# Patient Record
Sex: FEMALE | Race: WHITE
[De-identification: names, ages, dates, MRNs, and addresses within clinical notes are randomized per-mention and may not be internally consistent; named-entity substitution may affect disease eponyms.]

---

## 2017-12-16 NOTE — EDM.PDOC
ED HPI GENERAL MEDICAL PROBLEM





- General


Chief Complaint: Lower Extremity Injury/Pain


Stated Complaint: KNEE FRACTURE [id number]


Time Seen by Provider: 12/16/17 14:05


Source of Information: Reports: Patient, RN, RN Notes Reviewed


History Limitations: Reports: No Limitations





- History of Present Illness


INITIAL COMMENTS - FREE TEXT/NARRATIVE: 


Pt presents to the ER with c/o right knee/ankle pain. She states she fell 

outside her home last evening. She states the pain has been increasing as she 

has been walking on it today. She states the knee is more painful than the 

ankle. 


Onset: Today, Sudden


Location: Reports: Lower Extremity, Right


Quality: Reports: Sharp, Throbbing


Severity: Moderate


Improves with: Reports: None


Worsens with: Reports: None


Associated Symptoms: Reports: No Other Symptoms


  ** Right Knee


Pain Score (Numeric/FACES): 9





- Related Data


 Allergies











Allergy/AdvReac Type Severity Reaction Status Date / Time


 


amoxicillin Allergy  Rash Verified 12/16/17 13:58











Home Meds: 


 Home Meds





. [No Known Home Meds]  12/30/14 [History]











Past Medical History





- Past Health History


Medical/Surgical History: Denies Medical/Surgical History


HEENT History: Reports: None


Cardiovascular History: Reports: None


Respiratory History: Reports: None


Gastrointestinal History: Reports: None


Genitourinary History: Reports: None


OB/GYN History: Reports: None


Musculoskeletal History: Reports: None


Neurological History: Reports: None


Psychiatric History: Reports: None


Endocrine/Metabolic History: Reports: None


Hematologic History: Reports: None


Immunologic History: Reports: None


Oncologic (Cancer) History: Reports: None


Dermatologic History: Reports: None





- Infectious Disease History


Infectious Disease History: Reports: None





- Past Surgical History


Head Surgeries/Procedures: Reports: None





Social & Family History





- Family History


Family Medical History: Noncontributory





- Tobacco Use


Smoking Status *Q: Current Every Day Smoker


Years of Tobacco use: 16


Packs/Tins Daily: 1


Used Tobacco, but Quit: No


Second Hand Smoke Exposure: Yes





- Caffeine Use


Caffeine Use: Reports: Coffee





- Alcohol Use


Days Per Week of Alcohol Use: 4


Number of Drinks Per Day: 2


Total Drinks Per Week: 8





- Recreational Drug Use


Recreational Drug Use: No





- Living Situation & Occupation


Occupation: Employed





Review of Systems





- Review of Systems


Review Of Systems: ROS reveals no pertinent complaints other than HPI.





ED EXAM, GENERAL





- Physical Exam


Exam: See Below


Exam Limited By: No Limitations


General Appearance: Alert, WD/WN, No Apparent Distress


Eye Exam: Bilateral Eye: EOMI, Normal Inspection


Ears: Normal External Exam, Hearing Grossly Normal


Nose: Normal Inspection


Throat/Mouth: Normal Inspection, Normal Voice, No Airway Compromise


Head: Atraumatic, Normocephalic


Neck: Normal Inspection, Supple, Non-Tender, Full Range of Motion


Respiratory/Chest: No Respiratory Distress, Lungs Clear, Normal Breath Sounds, 

No Accessory Muscle Use, Chest Non-Tender


Cardiovascular: Normal Peripheral Pulses, Regular Rate, Rhythm, No Edema, No 

Gallop, No JVD, No Murmur, No Rub


Peripheral Pulses: 2+: Radial (L), Radial (R), Dorsalis Pedis (L), Dorsalis 

Pedis (R)


GI/Abdominal: Normal Bowel Sounds, Soft, Non-Tender


 (Female) Exam: Deferred


Rectal (Female) Exam: Deferred


Back Exam: Normal Inspection, Full Range of Motion, NT


Extremities: Normal Inspection, Normal Capillary Refill, Leg Pain (right knee), 

Limited Range of Motion (right knee)


Neurological: Alert, Oriented, CN II-XII Intact, Normal Cognition, Normal 

Reflexes, No Motor/Sensory Deficits


Psychiatric: Normal Affect, Normal Mood


Skin Exam: Warm, Dry, Intact, Normal Color, No Rash


Lymphatic: No Adenopathy





ED TRAUMA EXTREMITY PROCEDURES





- Splinting


  ** Right Lower Extremity


Splint Site: Right knee


Pre-Procedure NV Status: Normal


Post-Procedure NV Status: Normal


Splint Material: Velcro


Splint Design: Knee Immobilizer


Applied & Form Fitted By: Provider, Nurse


Provider Post-Splint Application NV Check: NV Status Normal


Complications: No





Course





- Vital Signs


Last Recorded V/S: 


 Last Vital Signs











Temp  97.6 F   12/16/17 13:59


 


Pulse  81   12/16/17 15:41


 


Resp  18   12/16/17 15:41


 


BP  113/74   12/16/17 15:41


 


Pulse Ox  100   12/16/17 15:41














- Radiology Interpretation


Free Text/Narrative:: 


Right ankle xray: No acute findings


Right knee xray: No acute findings. 





Departure





- Departure


Time of Disposition: 15:25


Disposition: Home, Self-Care 01


Clinical Impression: 


Contusion of right knee


Qualifiers:


 Encounter type: initial encounter Qualified Code(s): S80.01XA - Contusion of 

right knee, initial encounter








- Discharge Information


Instructions:  Crutch Use, Easy-to-Read, Knee Sprain, Easy-to-Read, Muscle 

Strain, Easy-to-Read, Knee Immobilizer, Easy-to-Read


Referrals: 


PCP,None [Primary Care Provider] - 


Forms:  ED Department Discharge


Additional Instructions: 


Tylenol or ibuprofen for pain.


Follow up with your primary care facility next week if no improvement.

## 2019-10-06 ENCOUNTER — HOSPITAL ENCOUNTER (EMERGENCY)
Dept: HOSPITAL 43 - DL.ED | Age: 39
Discharge: HOME | End: 2019-10-06
Payer: SELF-PAY

## 2019-10-06 VITALS — DIASTOLIC BLOOD PRESSURE: 87 MMHG | HEART RATE: 98 BPM | SYSTOLIC BLOOD PRESSURE: 152 MMHG

## 2019-10-06 DIAGNOSIS — E03.9: ICD-10-CM

## 2019-10-06 DIAGNOSIS — Z79.899: ICD-10-CM

## 2019-10-06 DIAGNOSIS — Z88.8: ICD-10-CM

## 2019-10-06 DIAGNOSIS — F17.210: ICD-10-CM

## 2019-10-06 DIAGNOSIS — Z88.1: ICD-10-CM

## 2019-10-06 DIAGNOSIS — J06.9: Primary | ICD-10-CM

## 2019-10-06 DIAGNOSIS — K21.9: ICD-10-CM

## 2019-10-06 PROCEDURE — 99282 EMERGENCY DEPT VISIT SF MDM: CPT

## 2019-10-06 NOTE — EDM.PDOC
ED HPI GENERAL MEDICAL PROBLEM





- General


Chief Complaint: General


Stated Complaint: BAD COUGH


Time Seen by Provider: 10/06/19 23:15


Source of Information: Reports: Patient


History Limitations: Reports: No Limitations





- History of Present Illness


INITIAL COMMENTS - FREE TEXT/NARRATIVE: 





This 40 yo female patient reports to the ED with a 6 day history of increasing 

productive cough with chills. The patient reports she has been coughing up 

greenish sputum over the past 3-4 days. The patient reports she has been taking 

over the counter medications, but her symptoms have continued to get worse. 


Duration: Day(s): (6), Constant, Getting Worse


Location: Reports: Chest


Quality: Reports: Other


Severity: Moderate


Improves with: Reports: None


Worsens with: Reports: None


Context: Reports: Other


Associated Symptoms: Reports: cough w sputum, Fever/Chills


Treatments PTA: Reports: Other Medication(s)


  ** Chest


Pain Score (Numeric/FACES): 6





- Related Data


 Allergies











Allergy/AdvReac Type Severity Reaction Status Date / Time


 


amoxapine Allergy  Rash Verified 10/06/19 23:03


 


amoxicillin Allergy  Rash Verified 10/06/19 23:03











Home Meds: 


 Home Meds





Levothyroxine [Synthroid] 50 mcg PO DAILY 18 [History]


Lansoprazole [Prevacid] 30 mg PO DAILY 19 [History]











Past Medical History





- Past Health History


Medical/Surgical History: Denies Medical/Surgical History


HEENT History: Reports: None


Cardiovascular History: Reports: None


Respiratory History: Reports: None


Gastrointestinal History: Reports: GERD


Genitourinary History: Reports: None


OB/GYN History: Reports: Pregnancy, Spontaneous 


Musculoskeletal History: Reports: None


Neurological History: Reports: None, Seizure, Other (See Below)


Other Neuro History: Seizures when she drinks


Psychiatric History: Reports: Addiction, Depression, Suicidal Ideation


Endocrine/Metabolic History: Reports: Hypothyroidism


Hematologic History: Reports: Anemia, Blood Transfusion(s), Other (See Below)


Other Hematologic History: Thalassemia trait


Immunologic History: Reports: None


Oncologic (Cancer) History: Reports: None


Dermatologic History: Reports: None





- Infectious Disease History


Infectious Disease History: Reports: None





- Past Surgical History


Head Surgeries/Procedures: Reports: None


HEENT Surgical History: Reports: Eye Surgery, Other (See Below)


Other HEENT Surgeries/Procedures: Lazy eye


Cardiovascular Surgical History: Reports: None


Respiratory Surgical History: Reports: None


GI Surgical History: Reports: Cholecystectomy


Female  Surgical History: Reports:  Section


Musculoskeletal Surgical History: Reports: None





Social & Family History





- Family History


Family Medical History: Noncontributory





- Tobacco Use


Smoking Status *Q: Current Every Day Smoker


Years of Tobacco use: 30


Packs/Tins Daily: 1





- Caffeine Use


Caffeine Use: Reports: Coffee


Other Caffeine Use: Pills


Caffeine Use Comment: coffee, 8 cups





- Recreational Drug Use


Recreational Drug Use: No





- Living Situation & Occupation


Occupation: Employed





ED ROS GENERAL





- Review of Systems


Review Of Systems: ROS reveals no pertinent complaints other than HPI.





ED EXAM, GENERAL





- Physical Exam


Exam: See Below


Exam Limited By: No Limitations


General Appearance: Alert, WD/WN, Moderate Distress


Eye Exam: Bilateral Eye: EOMI, Normal Inspection, PERRL


Ears: Normal External Exam, Normal Canal, Hearing Grossly Normal, Normal TMs


Nose: Normal Inspection, Normal Mucosa, No Blood


Throat/Mouth: Normal Inspection, Normal Lips, Normal Teeth, Normal Gums, Normal 

Oropharynx, Normal Voice, No Airway Compromise


Head: Atraumatic, Normocephalic


Neck: Normal Inspection, Supple, Non-Tender, Full Range of Motion


Respiratory/Chest: No Respiratory Distress, No Accessory Muscle Use, Chest Non-

Tender, Rhonchi (diffuse throughout lower lobes)


Cardiovascular: Normal Peripheral Pulses, Regular Rate, Rhythm, No Edema, No 

Gallop, No JVD, No Murmur, No Rub


GI/Abdominal: Normal Bowel Sounds, Soft, Non-Tender, No Organomegaly, No 

Distention, No Abnormal Bruit, No Mass


 (Female) Exam: Deferred


Rectal (Female) Exam: Deferred


Back Exam: Normal Inspection, Full Range of Motion, NT


Extremities: Normal Inspection, Normal Range of Motion, Non-Tender, Normal 

Capillary Refill, No Pedal Edema


Neurological: Alert, Oriented, CN II-XII Intact, Normal Cognition, Normal Gait, 

Normal Reflexes, No Motor/Sensory Deficits


Psychiatric: Normal Affect, Normal Mood


Skin Exam: Warm, Dry, Intact, Normal Color, No Rash


Lymphatic: No Adenopathy





Course





- Vital Signs


Last Recorded V/S: 


 Last Vital Signs











Temp  36.3 C   10/06/19 23:04


 


Pulse  98   10/06/19 23:04


 


Resp  16   10/06/19 23:04


 


BP  152/87 H  10/06/19 23:04


 


Pulse Ox  98   10/06/19 23:04














- Orders/Labs/Meds


Meds: 


Medications














Discontinued Medications














Generic Name Dose Route Start Last Admin





  Trade Name Noam  PRN Reason Stop Dose Admin


 


Azithromycin  500 mg  10/06/19 23:23  





  Zithromax  PO  10/06/19 23:24  





  ONETIME ONE   





     





     





     





     














Departure





- Departure


Time of Disposition: 23:26


Disposition: Home, Self-Care 01


Condition: Fair


Clinical Impression: 


URI (upper respiratory infection)


Qualifiers:


 URI type: unspecified URI Qualified Code(s): J06.9 - Acute upper respiratory 

infection, unspecified








- Discharge Information


*PRESCRIPTION DRUG MONITORING PROGRAM REVIEWED*: Not Applicable


*COPY OF PRESCRIPTION DRUG MONITORING REPORT IN PATIENT CARMEN: Not Applicable


Instructions:  Upper Respiratory Infection, Adult, Easy-to-Read


Forms:  ED Department Discharge


Care Plan Goals: 


The patient was advised of the examination and lab results during the visit. 

The patient was given an oral dose of Azithromycin while in the ED. The patient 

was given a dose of Robitussin AC #5mL to take at bedtime tonight. The patient 

was discharged with a script for Azithromycin (250 mg) #4 to take 1 by mouth on 

for 4 days and Robitussin AC #100 mL to take 5 mL by mouth at bedtime as needed 

for cough. If the patient has any additional symptoms or concerns, the patient 

should visit her primary care facility or return to the emergency department.

## 2021-08-16 NOTE — EDM.PDOC
ED HPI GENERAL MEDICAL PROBLEM





- General


Chief Complaint: Cardiovascular Problem


Stated Complaint: AMBULANCE


Time Seen by Provider: 21 14:58


Source of Information: Reports: Patient, EMS, RN, RN Notes Reviewed


History Limitations: Reports: No Limitations





- History of Present Illness


INITIAL COMMENTS - FREE TEXT/NARRATIVE: 


Sandra is a 41 y/o female with a history if iron deficiency anemia and recent 

diagnosis of pneumonia who presents to the ED via [facility] with 

complaints of presyncope.  The patient states she has continued on her 

doxycycline, as prescribed by her PCP five days ago, however she is not taking 

her ferrous sulfate.  The patient states she was bending over at work when she 

became dizzy; she denies loss of consciousness but notes she did strike her head

on the ground due to tipping forward.  She denies headache, seizure-like 

activity, vision changes, pupillary changes, or projectile vomiting.  She does 

not take daily blood thinners and denies blood dyscrasias.  Additionally, she 

notes chest tightness with transient shortness of breath.  She denies persistent

dizziness, fever, shaking chills, chest pain, palpitations, cough, sore throat, 

dyspepsia, nausea, vomiting, abdominal pain, or diarrhea.  She notes her last 

meal was yesterday night; her only food was one cup of coffee today.  She attest

to smoking one pack of cigarettes per day as well as occasionally alcohol use; 

she denies recreational drug use. 








  ** Hand


Pain Score (Numeric/FACES): 4





- Related Data


                                    Allergies











Allergy/AdvReac Type Severity Reaction Status Date / Time


 


amoxapine Allergy  Rash Verified 21 14:50


 


amoxicillin Allergy  Rash Verified 21 14:50











Home Meds: 


                                    Home Meds





Ibuprofen [Ibu] 600 mg PO DAILY 21 [History]


levonorgestreL [Mirena] 1 device VAG ASDIRECTED 21 [History]


Doxycycline Hyclate 100 mg PO BID 21 [History]


Levothyroxine Sodium [Levothyroxine] 75 mcg PO ASDIRECTED 21 [History]











Past Medical History





- Past Health History


Medical/Surgical History: Denies Medical/Surgical History


HEENT History: Reports: None


Cardiovascular History: Reports: None


Respiratory History: Reports: None


Gastrointestinal History: Reports: GERD


Genitourinary History: Reports: Other (See Below)


Other Genitourinary History: HX OF ACUTE CYSTITIS WITH HEMATURIA.  ASCUS WITH 

POSITIVE HIGH RISK HPV CERVICAL


OB/GYN History: Reports: Pregnancy, Spontaneous 


Musculoskeletal History: Reports: None


Neurological History: Reports: Migraines, Seizure, Other (See Below)


Other Neuro History: Seizures when she drinks


Psychiatric History: Reports: Addiction, Depression, Suicidal Ideation


Endocrine/Metabolic History: Reports: Hypothyroidism, Obesity/BMI 30+


Hematologic History: Reports: Anemia, Blood Transfusion(s), Other (See Below)


Other Hematologic History: Thalassemia trait


Immunologic History: Reports: None


Oncologic (Cancer) History: Reports: None


Dermatologic History: Reports: None





- Infectious Disease History


Infectious Disease History: Reports: None





- Past Surgical History


Head Surgeries/Procedures: Reports: None


HEENT Surgical History: Reports: Eye Surgery, Other (See Below)


Other HEENT Surgeries/Procedures: Lazy eye


Cardiovascular Surgical History: Reports: None


Respiratory Surgical History: Reports: None


GI Surgical History: Reports: Cholecystectomy


Female  Surgical History: Reports:  Section


Musculoskeletal Surgical History: Reports: None





Social & Family History





- Family History


Family Medical History: No Pertinent Family History





- Tobacco Use


Tobacco Use Status *Q: Current Every Day Tobacco User


Years of Tobacco use: 25


Packs/Tins Daily: 1





- Caffeine Use


Caffeine Use: Reports: Coffee


Other Caffeine Use: Pills


Caffeine Use Comment: coffee, 8 cups





- Recreational Drug Use


Recreational Drug Use: No





- Living Situation & Occupation


Occupation: Employed





ED ROS GENERAL





- Review of Systems


Review Of Systems: Comprehensive ROS is negative, except as noted in HPI.





ED EXAM, GENERAL





- Physical Exam


Exam: See Below


Exam Limited By: No Limitations


General Appearance: Alert, No Apparent Distress, Thin, Other (Ill-appearing)


Eye Exam: Bilateral Eye: EOMI, Normal Inspection, PERRL (3mm)


Ears: Normal External Exam, Normal Canal, Hearing Grossly Normal, Normal TMs


Ear Exam: Bilateral Ear: Auricle Normal, Canal Normal, TM normal


Nose: Normal Inspection, Normal Mucosa, No Blood


Throat/Mouth: Normal Inspection, Normal Oropharynx, Normal Voice, No Airway 

Compromise


Head: Atraumatic, Normocephalic.  No: Facial Swelling, Facial Tenderness, Sinus 

Tenderness


Neck: Other (No cervical point tenderness or pain with 

flexion/extension/rotation of neck.  NEXUS criteria negative.)


Respiratory/Chest: No Respiratory Distress, Lungs Clear, Normal Breath Sounds, 

No Accessory Muscle Use, Chest Non-Tender, Rhonchi (To bilateral lobes), Other 

(Productive cough).  No: Crackles, Rales, Wheezing, Stridor, Retractions


Cardiovascular: Normal Peripheral Pulses, Regular Rate, Rhythm, No Edema, No 

Gallop, No JVD, No Murmur, No Rub


Peripheral Pulses: 2+: Radial (L), Radial (R)


GI/Abdominal: Normal Bowel Sounds, Soft, Non-Tender.  No: Guarding, Rigid, 

Rebound


 (Female) Exam: Deferred


Rectal (Female) Exam: Deferred


Back Exam: Normal Inspection, Full Range of Motion


Extremities: Normal Inspection, Normal Range of Motion, Normal Capillary Refill


Neurological: Alert, Oriented, CN II-XII Intact, Normal Cognition, Normal 

Reflexes, No Motor/Sensory Deficits.  No: Slow to Respond, Memory Loss Remote 

Events, Memory Loss Recent Events


Psychiatric: Normal Affect, Normal Mood


Skin Exam: Warm, Dry, Intact, No Rash, Pallor.  No: Cyanosis, Jaundice, Mottled


  ** #1 Interpretation


EKG Date: 21


Time: 15:12


Rhythm: NSR


Rate (Beats/Min): 76


Axis: Normal


P-Wave: Present


QRS: Normal


ST-T: Normal


QT: Normal


WA/PQ Interval: 0.167


Comparison: No Change


EKG Interpretation Comments: 


NSR; No evidence of acute myocardial ischemia








Course





- Vital Signs


Last Recorded V/S: 


                                Last Vital Signs











Temp  98.3 F   21 14:45


 


Pulse  81   21 14:45


 


Resp  21 H  21 14:45


 


BP  130/81   21 14:45


 


Pulse Ox  100   21 14:45














- Orders/Labs/Meds


Labs: 


                                Laboratory Tests











  21 Range/Units





  15:10 15:10 15:10 


 


WBC  8.9    (5.0-10.0)  10^3/uL


 


RBC  5.04    (4.2-5.4)  10^6/uL


 


Hgb  7.7 L    (12.0-16.0)  g/dL


 


Hct  25.9 L    (37.0-47.0)  %


 


MCV  51.4 L D    ()  fL


 


MCH  15.3 L    (27.0-34.0)  pg


 


MCHC  29.7 L    (33.0-35.0)  g/dL


 


Plt Count  567 H    (150-450)  10^3/uL


 


Neut % (Auto)  80.2 H    (42.2-75.2)  %


 


Lymph % (Auto)  15.7 L    (20.5-50.1)  %


 


Mono % (Auto)  3.5    (2-8)  %


 


Eos % (Auto)  0.3 L    (1.0-3.0)  %


 


Baso % (Auto)  0.3    (0.0-1.0)  %


 


Sodium   140   (136-145)  mmol/L


 


Potassium   4.0   (3.5-5.1)  mmol/L


 


Chloride   103   ()  mmol/L


 


Carbon Dioxide   25   (21-32)  mmol/L


 


Anion Gap   16.0 H   (7-13)  mEq/L


 


BUN   17   (7-18)  mg/dL


 


Creatinine   0.75   (0.55-1.02)  mg/dL


 


Est Cr Clr Drug Dosing   86.96   mL/min


 


Estimated GFR (MDRD)   > 60   


 


BUN/Creatinine Ratio   22.7   (No establ ref range)  


 


Glucose   121 H   (70-99)  mg/dL


 


Lactic Acid    1.8  (0.4-2.0)  mmol/L


 


Calcium   8.7   (8.5-10.1)  mg/dL


 


Magnesium   2.1   (1.8-2.4)  mg/dL


 


Total Bilirubin   0.2   (0.2-1.0)  mg/dL


 


AST   8 L   (15-37)  U/L


 


ALT   22   (14-59)  U/L


 


Alkaline Phosphatase   53   ()  U/L


 


Troponin I High Sens   < 4   (<=51)  pg/mL


 


C-Reactive Protein   < 0.2   (0.0-0.9)  mg/dL


 


Total Protein   7.3   (6.4-8.2)  g/dL


 


Albumin   3.5   (3.4-5.0)  g/dL


 


Globulin   3.8   


 


Albumin/Globulin Ratio   0.9   


 


Urine Color     (YELLOW)  


 


Urine Appearance     (CLEAR)  


 


Urine pH     (5.0-9.0)  


 


Ur Specific Gravity     (1.005-1.030)  


 


Urine Protein     (NEGATIVE)  


 


Urine Glucose (UA)     (NEGATIVE)  


 


Urine Ketones     (NEGATIVE)  


 


Urine Occult Blood     (NEGATIVE)  


 


Urine Nitrite     (NEGATIVE)  


 


Urine Bilirubin     (NEGATIVE)  


 


Urine Urobilinogen     (0.2-1.0)  mg/dL


 


Ur Leukocyte Esterase     (NEGATIVE)  


 


Urine RBC     (0-5)  /HPF


 


Urine WBC     (0-5/HPF)  /HPF


 


Ur Epithelial Cells     (NOT SEEN)  /HPF


 


Urine Bacteria     (0-FEW/HPF)  /HPF


 


Urine Opiates Screen     (NEGATIVE)  


 


Ur Oxycodone Screen     (NEGATIVE)  


 


Urine Methadone Screen     (NEGATIVE)  


 


Ur Barbiturates Screen     (NEGATIVE)  


 


U Tricyclic Antidepress     (NEGATIVE)  


 


Ur Phencyclidine Scrn     (NEGATIVE)  


 


Ur Amphetamine Screen     (NEGATIVE)  


 


U Methamphetamines Scrn     (NEGATIVE)  


 


Urine MDMA Screen     (NEGATIVE)  


 


U Benzodiazepines Scrn     (NEGATIVE)  


 


Urine Cocaine Screen     (NEGATIVE)  


 


U Marijuana (THC) Screen     (NEGATIVE)  














  21 Range/Units





  16:00 16:00 


 


WBC    (5.0-10.0)  10^3/uL


 


RBC    (4.2-5.4)  10^6/uL


 


Hgb    (12.0-16.0)  g/dL


 


Hct    (37.0-47.0)  %


 


MCV    ()  fL


 


MCH    (27.0-34.0)  pg


 


MCHC    (33.0-35.0)  g/dL


 


Plt Count    (150-450)  10^3/uL


 


Neut % (Auto)    (42.2-75.2)  %


 


Lymph % (Auto)    (20.5-50.1)  %


 


Mono % (Auto)    (2-8)  %


 


Eos % (Auto)    (1.0-3.0)  %


 


Baso % (Auto)    (0.0-1.0)  %


 


Sodium    (136-145)  mmol/L


 


Potassium    (3.5-5.1)  mmol/L


 


Chloride    ()  mmol/L


 


Carbon Dioxide    (21-32)  mmol/L


 


Anion Gap    (7-13)  mEq/L


 


BUN    (7-18)  mg/dL


 


Creatinine    (0.55-1.02)  mg/dL


 


Est Cr Clr Drug Dosing    mL/min


 


Estimated GFR (MDRD)    


 


BUN/Creatinine Ratio    (No establ ref range)  


 


Glucose    (70-99)  mg/dL


 


Lactic Acid    (0.4-2.0)  mmol/L


 


Calcium    (8.5-10.1)  mg/dL


 


Magnesium    (1.8-2.4)  mg/dL


 


Total Bilirubin    (0.2-1.0)  mg/dL


 


AST    (15-37)  U/L


 


ALT    (14-59)  U/L


 


Alkaline Phosphatase    ()  U/L


 


Troponin I High Sens    (<=51)  pg/mL


 


C-Reactive Protein    (0.0-0.9)  mg/dL


 


Total Protein    (6.4-8.2)  g/dL


 


Albumin    (3.4-5.0)  g/dL


 


Globulin    


 


Albumin/Globulin Ratio    


 


Urine Color  Yellow   (YELLOW)  


 


Urine Appearance  Clear   (CLEAR)  


 


Urine pH  6.5   (5.0-9.0)  


 


Ur Specific Gravity  1.015   (1.005-1.030)  


 


Urine Protein  Negative   (NEGATIVE)  


 


Urine Glucose (UA)  Negative   (NEGATIVE)  


 


Urine Ketones  Negative   (NEGATIVE)  


 


Urine Occult Blood  Trace-intact H   (NEGATIVE)  


 


Urine Nitrite  Negative   (NEGATIVE)  


 


Urine Bilirubin  Negative   (NEGATIVE)  


 


Urine Urobilinogen  0.2   (0.2-1.0)  mg/dL


 


Ur Leukocyte Esterase  Negative   (NEGATIVE)  


 


Urine RBC  0-5   (0-5)  /HPF


 


Urine WBC  0-5   (0-5/HPF)  /HPF


 


Ur Epithelial Cells  Moderate H   (NOT SEEN)  /HPF


 


Urine Bacteria  Few   (0-FEW/HPF)  /HPF


 


Urine Opiates Screen   Negative  (NEGATIVE)  


 


Ur Oxycodone Screen   Negative  (NEGATIVE)  


 


Urine Methadone Screen   Negative  (NEGATIVE)  


 


Ur Barbiturates Screen   Negative  (NEGATIVE)  


 


U Tricyclic Antidepress   Negative  (NEGATIVE)  


 


Ur Phencyclidine Scrn   Negative  (NEGATIVE)  


 


Ur Amphetamine Screen   Negative  (NEGATIVE)  


 


U Methamphetamines Scrn   Negative  (NEGATIVE)  


 


Urine MDMA Screen   Negative  (NEGATIVE)  


 


U Benzodiazepines Scrn   Negative  (NEGATIVE)  


 


Urine Cocaine Screen   Negative  (NEGATIVE)  


 


U Marijuana (THC) Screen   Negative  (NEGATIVE)  











Meds: 


Medications














Discontinued Medications














Generic Name Dose Route Start Last Admin





  Trade Name Freq  PRN Reason Stop Dose Admin


 


Lactated Ringer's  1,000 mls @ 999 mls/hr  21 15:01  21 15:14





  Ringers, Lactated  IV  21 16:01  999 mls/hr





  .BOLUS ONE   Administration


 


Metronidazole  500 mg  21 16:35  21 17:04





  Metronidazole 250 Mg Tab  PO  21 16:36  500 mg





  ONETIME ONE   Administration














- Re-Assessments/Exams


Free Text/Narrative Re-Assessment/Exam: 





21


LR 1L bolus initiated while labs pending. 





Findings of examination and lab work reviewed with patient.  Will treat anemia 

with ferrous sulfate and bacterial vaginosis with metronidazole.  Patient 

instructed to follow up with primary care in 5-7 days regarding today's visit.  

Discussed supportive cares as well as red flag signs and symptoms which would 

warrant reevaluation reviewed.  Patient verbalized understanding and agreement 

with the plan of care.











Departure





- Departure


Time of Disposition: 16:48


Disposition: Home, Self-Care 01


Condition: Fair


Clinical Impression: 


 Microcytic hypochromic anemia, Pre-syncope, Bacterial vaginosis





Iron deficiency anemia


Qualifiers:


 Iron deficiency anemia type: other iron deficiency Qualified Code(s): D50.8 - 

Other iron deficiency anemias





Instructions:  Near-Syncope, Bacterial Vaginosis


Forms:  ED Department Discharge


Additional Instructions: 


Rx: ferrous sulfate


Rx: metronidazole





1.) Follow up with your primary care provider by the end of the week for repeat 

lab studies.


2.) Take all of your antibiotic, even as symptoms improve. 


3.) Continue with your doxycycline. 


4.) Eat iron-rich foods often. 


5.) Rest.


6.) Follow up with your primary care provider, or return to the emergency 

department with any persistent or worsening symptoms.

## 2021-12-22 NOTE — CT
PROCEDURE INFORMATION: 

Exam: CT Abdomen And Pelvis Without Contrast 

Exam date and time: 12/22/2021 5:20 PM 

Age: 41 years old 

Clinical indication: Other: Left sided abdominal pain with hematuria 



TECHNIQUE: 

Imaging protocol: Computed tomography of the abdomen and pelvis without 

contrast. 

Radiation optimization: All CT scans at this facility use at least one of these 

dose optimization techniques: automated exposure control; mA and/or kV 

adjustment per patient size (includes targeted exams where dose is matched to 

clinical indication); or iterative reconstruction. 



COMPARISON: 

No relevant prior studies available. 



FINDINGS: 

Lungs: The visualized portions of the lung bases are normal. 

Heart: The heart is not enlarged. 



Liver: The liver is normal. 

Gallbladder and bile ducts: There has been a cholecystectomy. 

Pancreas: The pancreas is normal. 

Spleen: The spleen is normal. 

Adrenal glands: The adrenal glands are normal. 

Kidneys and ureters: The kidneys are normal. The ureters are normal. 

Stomach and bowel: The stomach is normal. No over distention of bowel loops is 

seen. 

Appendix: A normal appendix is identified. 



Intraperitoneal space: No evidence of intraperitoneal free air. 

Vasculature: The aorta is normal. 

Lymph nodes: No pathologic lymph node enlargement is demonstrated. 

Urinary bladder: The bladder is distended. 

Reproductive: The uterus is moderately enlarged measuring about 8.4 cm in 

transverse by 9.6 cm in AP by 8.1 cm in craniocaudal dimension. 

Bones/joints: Unremarkable 

Soft tissues: There is a fat-containing umbilical hernia. 



IMPRESSION: 

No acute abnormality.

## 2021-12-22 NOTE — EDM.PDOC
ED HPI GENERAL MEDICAL PROBLEM





- General


Stated Complaint: PAIN IN STOMACH,HIGH BLOOD PRESSURE


Time Seen by Provider: 21 16:15


Source of Information: Reports: Patient


History Limitations: Reports: No Limitations





- History of Present Illness


INITIAL COMMENTS - FREE TEXT/NARRATIVE: 





This 42 yo female patient reports to the ED with a 2 day history of left lower 

quadrant abdominal pain. The patient reports her pain has been intermittent over

the past 2 days. The patient took ibuprofen once this morning with no symptom 

relief. The patient reports she has had soft stools, but has noticed her abdomen

is "bloated". The patient states she has not ever had similar symptoms in the 

past. The patient has not attempted to get into the clinic for her current 

symptoms. The patient reports she had an appointment with Dr. Johnson yesterday 

for an upper endoscopy, but she did not get up in time and missed her scheduled 

appointment. 


Onset Date: 21


Duration: Intermittent


Location: Reports: Abdomen (LLQ)


Quality: Reports: Ache, Sharp


Severity: Moderate


Improves with: Reports: None


Worsens with: Reports: None


Context: Reports: Other


Associated Symptoms: Reports: No Other Symptoms


Treatments PTA: Reports: NSAIDS


  ** Left Middle Abdominal


Pain Score (Numeric/FACES): 5





- Related Data


                                    Allergies











Allergy/AdvReac Type Severity Reaction Status Date / Time


 


amoxapine Allergy  Rash Verified 21 16:32


 


amoxicillin Allergy  Rash Verified 21 16:32











Home Meds: 


                                    Home Meds





Ibuprofen [Ibu] 600 mg PO DAILY 21 [History]


levonorgestreL [Mirena] 1 device VAG ASDIRECTED 21 [History]


Doxycycline Hyclate 100 mg PO BID 21 [History]


Levothyroxine Sodium [Levothyroxine] 75 mcg PO DAILY 21 [History]


Ferrous Sulfate 325 mg PO BID 21 [History]


Lansoprazole [Prevacid] 30 mg PO DAILY 21 [History]


Multivitamin [Multivitamins] 1 tab PO DAILY 21 [History]











Past Medical History





- Past Health History


Medical/Surgical History: Denies Medical/Surgical History


HEENT History: Reports: None


Cardiovascular History: Reports: None


Respiratory History: Reports: None


Gastrointestinal History: Reports: GERD


Genitourinary History: Reports: Other (See Below)


Other Genitourinary History: HX OF ACUTE CYSTITIS WITH HEMATURIA.  ASCUS WITH 

POSITIVE HIGH RISK HPV CERVICAL


OB/GYN History: Reports: Pregnancy, Spontaneous 


Musculoskeletal History: Reports: None


Neurological History: Reports: Migraines, Seizure, Other (See Below)


Other Neuro History: Seizures when she drinks


Psychiatric History: Reports: Addiction, Depression, Suicidal Ideation


Endocrine/Metabolic History: Reports: Hypothyroidism, Obesity/BMI 30+


Hematologic History: Reports: Anemia, Blood Transfusion(s), Other (See Below)


Other Hematologic History: Thalassemia trait


Immunologic History: Reports: None


Oncologic (Cancer) History: Reports: None


Dermatologic History: Reports: None





- Infectious Disease History


Infectious Disease History: Reports: None





- Past Surgical History


Head Surgeries/Procedures: Reports: None


HEENT Surgical History: Reports: Eye Surgery, Other (See Below)


Other HEENT Surgeries/Procedures: Lazy eye


Cardiovascular Surgical History: Reports: None


Respiratory Surgical History: Reports: None


GI Surgical History: Reports: Cholecystectomy


Female  Surgical History: Reports:  Section


Musculoskeletal Surgical History: Reports: None





Social & Family History





- Family History


Family Medical History: No Pertinent Family History





- Caffeine Use


Caffeine Use: Reports: Coffee


Other Caffeine Use: Pills


Caffeine Use Comment: coffee, 8 cups





- Living Situation & Occupation


Occupation: Employed





ED ROS GENERAL





- Review of Systems


Review Of Systems: Comprehensive ROS is negative, except as noted in HPI.





ED EXAM, GI/ABD





- Physical Exam


Exam: See Below


Exam Limited By: No Limitations


General Appearance: Alert, WD/WN, Mild Distress


Eyes: Bilateral: Normal Appearance, EOMI


Ears: Normal External Exam, Normal Canal, Hearing Grossly Normal, Normal TMs


Nose: Normal Inspection, Normal Mucosa, No Blood


Throat/Mouth: Normal Inspection, Normal Lips, Normal Teeth, Normal Gums, Normal 

Oropharynx, Normal Voice, No Airway Compromise


Head: Atraumatic, Normocephalic


Neck: Normal Inspection, Supple, Non-Tender, Full Range of Motion


Respiratory/Chest: No Respiratory Distress, Lungs Clear, Normal Breath Sounds, 

No Accessory Muscle Use, Chest Non-Tender


Cardiovascular: Normal Peripheral Pulses


GI/Abdominal Exam: Normal Bowel Sounds, No Organomegaly, No Distention, No 

Abnormal Bruit, No Mass, Pelvis Stable, Tender (LLQ)


 (Female) Exam: Deferred


Rectal (Female) Exam: Deferred


Back Exam: Normal Inspection, Full Range of Motion, NT


Extremities: Normal Inspection, Normal Range of Motion, Non-Tender, Normal 

Capillary Refill, No Pedal Edema


Neurological: Alert, Oriented, CN II-XII Intact, Normal Cognition, Normal Gait, 

Normal Reflexes, No Motor/Sensory Deficits


Psychiatric: Normal Affect, Normal Mood


Skin Exam: Warm, Dry, Intact, Normal Color, No Rash


Lymphatic: No Adenopathy





Course





- Vital Signs


Last Recorded V/S: 


                                Last Vital Signs











Temp  98.3 F   21 16:26


 


Pulse  82   21 16:26


 


Resp  18   21 16:26


 


BP  159/96 H  21 16:39


 


Pulse Ox  100   21 16:26














- Orders/Labs/Meds


Labs: 


                                Laboratory Tests











  21 Range/Units





  16:13 16:13 16:13 


 


WBC     (5.0-10.0)  10^3/uL


 


RBC     (4.2-5.4)  10^6/uL


 


Hgb     (12.0-16.0)  g/dL


 


Hct     (37.0-47.0)  %


 


MCV     ()  fL


 


MCH     (27.0-34.0)  pg


 


MCHC     (33.0-35.0)  g/dL


 


Plt Count     (150-450)  10^3/uL


 


Neut % (Auto)     (42.2-75.2)  %


 


Lymph % (Auto)     (20.5-50.1)  %


 


Mono % (Auto)     (2-8)  %


 


Eos % (Auto)     (1.0-3.0)  %


 


Baso % (Auto)     (0.0-1.0)  %


 


Sodium     (136-145)  mmol/L


 


Potassium     (3.5-5.1)  mmol/L


 


Chloride     ()  mmol/L


 


Carbon Dioxide     (21-32)  mmol/L


 


Anion Gap     (7-13)  mEq/L


 


BUN     (7-18)  mg/dL


 


Creatinine     (0.55-1.02)  mg/dL


 


Est Cr Clr Drug Dosing     mL/min


 


Estimated GFR (MDRD)     


 


BUN/Creatinine Ratio     (No establ ref range)  


 


Glucose     (70-99)  mg/dL


 


Calcium     (8.5-10.1)  mg/dL


 


Total Bilirubin     (0.2-1.0)  mg/dL


 


AST     (15-37)  U/L


 


ALT     (14-59)  U/L


 


Alkaline Phosphatase     ()  U/L


 


Total Protein     (6.4-8.2)  g/dL


 


Albumin     (3.4-5.0)  g/dL


 


Globulin     


 


Albumin/Globulin Ratio     


 


Amylase     ()  U/L


 


Lipase     ()  U/L


 


Urine Color  Red    (YELLOW)  


 


Urine Appearance  Cloudy    (CLEAR)  


 


Urine pH  7.0    (5.0-9.0)  


 


Ur Specific Gravity  1.025    (1.005-1.030)  


 


Urine Protein  100 H    (NEGATIVE)  


 


Urine Glucose (UA)  Negative    (NEGATIVE)  


 


Urine Ketones  Negative    (NEGATIVE)  


 


Urine Occult Blood  Large H    (NEGATIVE)  


 


Urine Nitrite  Negative    (NEGATIVE)  


 


Urine Bilirubin  Negative    (NEGATIVE)  


 


Urine Urobilinogen  0.2    (0.2-1.0)  mg/dL


 


Ur Leukocyte Esterase  Negative    (NEGATIVE)  


 


Urine RBC  Packed H    (0-5)  /HPF


 


Urine WBC  0-5    (0-5/HPF)  /HPF


 


Ur Epithelial Cells  Rare    (NOT SEEN)  /HPF


 


Amorphous Sediment  Rare    (NOT SEEN)  /HPF


 


Urine Bacteria  Few    (0-FEW/HPF)  /HPF


 


Urine HCG, Qual   Negative   


 


Urine Opiates Screen    Negative  (NEGATIVE)  


 


Ur Oxycodone Screen    Negative  (NEGATIVE)  


 


Urine Methadone Screen    Negative  (NEGATIVE)  


 


Ur Barbiturates Screen    Negative  (NEGATIVE)  


 


U Tricyclic Antidepress    Negative  (NEGATIVE)  


 


Ur Phencyclidine Scrn    Negative  (NEGATIVE)  


 


Ur Amphetamine Screen    Negative  (NEGATIVE)  


 


U Methamphetamines Scrn    Negative  (NEGATIVE)  


 


Urine MDMA Screen    Negative  (NEGATIVE)  


 


U Benzodiazepines Scrn    Negative  (NEGATIVE)  


 


Urine Cocaine Screen    Negative  (NEGATIVE)  


 


U Marijuana (THC) Screen    Negative  (NEGATIVE)  














  21 Range/Units





  16:29 16:29 


 


WBC  8.5   (5.0-10.0)  10^3/uL


 


RBC  5.22   (4.2-5.4)  10^6/uL


 


Hgb  8.4 L   (12.0-16.0)  g/dL


 


Hct  27.6 L   (37.0-47.0)  %


 


MCV  52.9 L   ()  fL


 


MCH  16.1 L   (27.0-34.0)  pg


 


MCHC  30.4 L   (33.0-35.0)  g/dL


 


Plt Count  548 H   (150-450)  10^3/uL


 


Neut % (Auto)  69.8   (42.2-75.2)  %


 


Lymph % (Auto)  23.7   (20.5-50.1)  %


 


Mono % (Auto)  5.1   (2-8)  %


 


Eos % (Auto)  0.8 L   (1.0-3.0)  %


 


Baso % (Auto)  0.6   (0.0-1.0)  %


 


Sodium   140  (136-145)  mmol/L


 


Potassium   4.1  (3.5-5.1)  mmol/L


 


Chloride   104  ()  mmol/L


 


Carbon Dioxide   27  (21-32)  mmol/L


 


Anion Gap   13.1 H  (7-13)  mEq/L


 


BUN   10  (7-18)  mg/dL


 


Creatinine   0.74  (0.55-1.02)  mg/dL


 


Est Cr Clr Drug Dosing   101.94  mL/min


 


Estimated GFR (MDRD)   > 60  


 


BUN/Creatinine Ratio   13.5  (No establ ref range)  


 


Glucose   93  (70-99)  mg/dL


 


Calcium   8.8  (8.5-10.1)  mg/dL


 


Total Bilirubin   0.3  (0.2-1.0)  mg/dL


 


AST   8 L  (15-37)  U/L


 


ALT   18  (14-59)  U/L


 


Alkaline Phosphatase   53  ()  U/L


 


Total Protein   7.6  (6.4-8.2)  g/dL


 


Albumin   3.8  (3.4-5.0)  g/dL


 


Globulin   3.8  


 


Albumin/Globulin Ratio   1.0  


 


Amylase   51  ()  U/L


 


Lipase   82  ()  U/L


 


Urine Color    (YELLOW)  


 


Urine Appearance    (CLEAR)  


 


Urine pH    (5.0-9.0)  


 


Ur Specific Gravity    (1.005-1.030)  


 


Urine Protein    (NEGATIVE)  


 


Urine Glucose (UA)    (NEGATIVE)  


 


Urine Ketones    (NEGATIVE)  


 


Urine Occult Blood    (NEGATIVE)  


 


Urine Nitrite    (NEGATIVE)  


 


Urine Bilirubin    (NEGATIVE)  


 


Urine Urobilinogen    (0.2-1.0)  mg/dL


 


Ur Leukocyte Esterase    (NEGATIVE)  


 


Urine RBC    (0-5)  /HPF


 


Urine WBC    (0-5/HPF)  /HPF


 


Ur Epithelial Cells    (NOT SEEN)  /HPF


 


Amorphous Sediment    (NOT SEEN)  /HPF


 


Urine Bacteria    (0-FEW/HPF)  /HPF


 


Urine HCG, Qual    


 


Urine Opiates Screen    (NEGATIVE)  


 


Ur Oxycodone Screen    (NEGATIVE)  


 


Urine Methadone Screen    (NEGATIVE)  


 


Ur Barbiturates Screen    (NEGATIVE)  


 


U Tricyclic Antidepress    (NEGATIVE)  


 


Ur Phencyclidine Scrn    (NEGATIVE)  


 


Ur Amphetamine Screen    (NEGATIVE)  


 


U Methamphetamines Scrn    (NEGATIVE)  


 


Urine MDMA Screen    (NEGATIVE)  


 


U Benzodiazepines Scrn    (NEGATIVE)  


 


Urine Cocaine Screen    (NEGATIVE)  


 


U Marijuana (THC) Screen    (NEGATIVE)  














- Radiology Interpretation


Free Text/Narrative:: 





[facility] 


Final Radiology Report  Call: [phone]


assistance Online chat: [email or web address]


Name: RASHIDA HUITRON Age: 41Years F Date: 2021


MRN: [id number] SSN: -- : [id number]





Study: CT ABDOMEN PELVIS WO CONT 








Requesting Physician: Ruel Coronado


Accession: [id number] Images: [id number]


Addl Studies:


Provided Clinical History: left sided abdominal pain with hematuria


Contrast: Without Contrast Medium:


Contrast Amount: Contrast Method:


Page 1 of 2


PROCEDURE INFORMATION:


Exam: CT Abdomen And Pelvis Without Contrast


Exam date and time: 2021 5:20 PM


Age: 41 years old


Clinical indication: Other: Left sided abdominal pain with hematuria


TECHNIQUE:


Imaging protocol: Computed tomography of the abdomen and pelvis without 

contrast.


Radiation optimization: All CT scans at this facility use at least one of these 

dose optimization


techniques: automated exposure control; mA and/or kV adjustment per patient size

 (includes targeted


exams where dose is matched to clinical indication); or iterative 

reconstruction.


COMPARISON:


No relevant prior studies available.


FINDINGS:


Lungs: The visualized portions of the lung bases are normal.


Heart: The heart is not enlarged.


Liver: The liver is normal.


Gallbladder and bile ducts: There has been a cholecystectomy.


Pancreas: The pancreas is normal.


Spleen: The spleen is normal.


Adrenal glands: The adrenal glands are normal.


Kidneys and ureters: The kidneys are normal. The ureters are normal.


Stomach and bowel: The stomach is normal. No over distention of bowel loops is 

seen.


Appendix: A normal appendix is identified.


Intraperitoneal space: No evidence of intraperitoneal free air.


Vasculature: The aorta is normal.


Lymph nodes: No pathologic lymph node enlargement is demonstrated.


RASHIDA HUITRON Accession: [id number] MRN:[id number] | Final Radiology 

Report


CONFIDENTIALITY STATEMENT


This report is intended only for use by the referring physician, and only in 

accordance with law. If you received this in error, call [phone].


Page 2 of 2


Urinary bladder: The bladder is distended.


Reproductive: The uterus is moderately enlarged measuring about 8.4 cm in 

transverse by 9.6 cm in


AP by 8.1 cm in craniocaudal dimension.


Bones/joints: Unremarkable


Soft tissues: There is a fat-containing umbilical hernia.


IMPRESSION:


No acute abnormality.


Thank you for allowing us to participate in the care of your patient.


Dictated and Authenticated by: Wilson, Duane, MD


2021 6:25 PM Central Time ([place] & [place])





Departure





- Departure


Time of Disposition: 18:36


Disposition: Home, Self-Care 01


Condition: Fair


Clinical Impression: 


Abdominal pain


Qualifiers:


 Abdominal location: left lower quadrant Qualified Code(s): R10.32 - Left lower 

quadrant pain








- Discharge Information


*PRESCRIPTION DRUG MONITORING PROGRAM REVIEWED*: Not Applicable


*COPY OF PRESCRIPTION DRUG MONITORING REPORT IN PATIENT CARMEN: Not Applicable


Instructions:  Abdominal Pain, Adult, Easy-to-Read


Forms:  ED Department Discharge


Care Plan Goals: 


The patient was advised of the examination, lab and CT results during the visit.

 The patient was encouraged to take Tylenol or ibuprofen for temporary symptom 

relief. If the patient has any additional symptoms or concerns, the patient 

should either return to the emergency department or visit her primary care 

facility. 





Sepsis Event Note (ED)





- Focused Exam


Vital Signs: 


                                   Vital Signs











  Temp Pulse Resp BP Pulse Ox


 


 21 16:39     159/96 H 


 


 21 16:26  98.3 F  82  18  159/106 H  100

## 2022-09-11 NOTE — EDM.PDOC
ED HPI GENERAL MEDICAL PROBLEM





- General


Chief Complaint: Abdominal Pain


Stated Complaint: stomach pain for 2 weeks headache


Time Seen by Provider: 10/11/20 10:51


Source of Information: Reports: Patient, RN, RN Notes Reviewed


History Limitations: Reports: No Limitations





- History of Present Illness


INITIAL COMMENTS - FREE TEXT/NARRATIVE: 


Patient presents to the ED via personal vehicle for complaints of RLQ abdominal 

pain.  She reports the pain began about two weeks ago, and has progressively 

worsened.  She states the pain radiates into her midpelvis.  She attests to 

vaginal bleeding since the pain began - this is not normal for her cycle.  She 

denies dysuria, hematuria, blood stools, black tarry stools.  She denies fever 

or shaking chills.  She states she is having unprotected sex and has not taken a

pregnancy test at home.  She has not taken any medications for this problem.  





  ** Right Upper Abdomen


Pain Score (Numeric/FACES): 7





- Related Data


                                    Allergies











Allergy/AdvReac Type Severity Reaction Status Date / Time


 


amoxapine Allergy  Rash Verified 19 19:12


 


amoxicillin Allergy  Rash Verified 19 19:12











Home Meds: 


                                    Home Meds





Levothyroxine [Synthroid] 50 mcg PO DAILY 18 [History]


Lansoprazole [Prevacid] 30 mg PO DAILY 19 [History]











Past Medical History





- Past Health History


Medical/Surgical History: Denies Medical/Surgical History


HEENT History: Reports: None


Cardiovascular History: Reports: None


Respiratory History: Reports: None


Gastrointestinal History: Reports: GERD


Genitourinary History: Reports: None


OB/GYN History: Reports: Pregnancy, Spontaneous 


Musculoskeletal History: Reports: None


Neurological History: Reports: None, Seizure, Other (See Below)


Other Neuro History: Seizures when she drinks


Psychiatric History: Reports: Addiction, Depression, Suicidal Ideation


Endocrine/Metabolic History: Reports: Hypothyroidism


Hematologic History: Reports: Anemia, Blood Transfusion(s), Other (See Below)


Other Hematologic History: Thalassemia trait


Immunologic History: Reports: None


Oncologic (Cancer) History: Reports: None


Dermatologic History: Reports: None





- Infectious Disease History


Infectious Disease History: Reports: None





- Past Surgical History


Head Surgeries/Procedures: Reports: None


HEENT Surgical History: Reports: Eye Surgery, Other (See Below)


Other HEENT Surgeries/Procedures: Lazy eye


Cardiovascular Surgical History: Reports: None


Respiratory Surgical History: Reports: None


GI Surgical History: Reports: Cholecystectomy


Female  Surgical History: Reports:  Section


Musculoskeletal Surgical History: Reports: None





Social & Family History





- Family History


Family Medical History: Noncontributory





- Caffeine Use


Caffeine Use: Reports: Coffee


Other Caffeine Use: Pills


Caffeine Use Comment: coffee, 8 cups





- Living Situation & Occupation


Occupation: Employed





ED ROS GENERAL





- Review of Systems


Review Of Systems: Comprehensive ROS is negative, except as noted in HPI.





ED EXAM, RENAL/





- Physical Exam


Exam: See Below


Exam Limited By: No Limitations


General Appearance: Alert, WD/WN, No Apparent Distress


Throat/Mouth: Normal Voice, No Airway Compromise


GI/Abdominal: Normal Bowel Sounds, Soft, No Distention, No Mass, Tender (To RLQ)


 (Female) Exam: Deferred


Rectal (Female) Exam: Deferred


Back Exam: No: CVA Tenderness (R)


Neurological: Alert, Oriented, CN II-XII Intact


Skin Exam: Warm, Dry, Intact, Normal Color, No Rash.  No: Ecchymosis, Petechiae,

 Rash





Course





- Vital Signs


Last Recorded V/S: 


                                Last Vital Signs











Temp  98.1 F   10/11/20 10:06


 


Pulse  88   10/11/20 10:06


 


Resp  18   10/11/20 10:06


 


BP  124/80   10/11/20 10:06


 


Pulse Ox  99   10/11/20 10:06














- Orders/Labs/Meds


Orders: 


                               Active Orders 24 hr











 Category Date Time Status


 


 CULTURE URINE [RM] Stat Lab  10/11/20 10:05 Received











Labs: 


                                Laboratory Tests











  10/11/20 10/11/20 10/11/20 Range/Units





  10:00 10:05 10:05 


 


Urine Color   Yellow   (YELLOW)  


 


Urine Appearance   Cloudy   (CLEAR)  


 


Urine pH   6.0   (5.0-9.0)  


 


Ur Specific Gravity   1.010   (1.005-1.030)  


 


Urine Protein   Negative   (NEGATIVE)  


 


Urine Glucose (UA)   Negative   (NEGATIVE)  


 


Urine Ketones   Negative   (NEGATIVE)  


 


Urine Occult Blood   Moderate H   (NEGATIVE)  


 


Urine Nitrite   Negative   (NEGATIVE)  


 


Urine Bilirubin   Negative   (NEGATIVE)  


 


Urine Urobilinogen   0.2   (0.2-1.0)  mg/dL


 


Ur Leukocyte Esterase   Small H   (NEGATIVE)  


 


Urine RBC   10-20 H   /HPF


 


Urine WBC   10-20 H   (0-5/HPF)  /HPF


 


Ur Epithelial Cells   Many H   (NOT SEEN)  /HPF


 


Urine Bacteria   Moderate H   (0-FEW/HPF)  /HPF


 


Urine Mucus   Rare   (NOT SEEN)  /LPF


 


Urine HCG, Qual    Negative  


 


SARS CoV-2 RNA Rapid LEIF  Negative    (NEGATIVE)  














- Re-Assessments/Exams


Free Text/Narrative Re-Assessment/Exam: 





10/11/20 10:40


UA revealed clue cells.  Will treat with Flagyl and instruct patient to follow 

up with primary care provider for urine recheck. 





10/11/20 11:02








Departure





- Departure


Time of Disposition: 10:45


Disposition: Home, Self-Care 01


Condition: Good


Clinical Impression: 


 Bacterial vaginosis








- Discharge Information


*PRESCRIPTION DRUG MONITORING PROGRAM REVIEWED*: Not Applicable


*COPY OF PRESCRIPTION DRUG MONITORING REPORT IN PATIENT CARMEN: Not Applicable


Instructions:  Bacterial Vaginosis, Easy-to-Read


Forms:  ED Department Discharge


Additional Instructions: 


Rx: Flagyl





Follow up with primary care provider for follow up urine recheck in one week.  





Drink plenty of fluids.  Avoid alcohol with this medication!





Sepsis Event Note (ED)





- Evaluation


Sepsis Screening Result: No Definite Risk





- Focused Exam


Vital Signs: 


                                   Vital Signs











  Temp Pulse Resp BP Pulse Ox


 


 10/11/20 10:06  98.1 F  88  18  124/80  99














- My Orders


Last 24 Hours: 


My Active Orders





10/11/20 10:05


CULTURE URINE [RM] Stat 














- Assessment/Plan


Last 24 Hours: 


My Active Orders





10/11/20 10:05


CULTURE URINE [RM] Stat Yes

## 2022-10-28 ENCOUNTER — HOSPITAL ENCOUNTER (EMERGENCY)
Dept: HOSPITAL 50 - VM.ED | Age: 42
Discharge: LEFT BEFORE DISCHARGE | End: 2022-10-28
Payer: COMMERCIAL

## 2022-10-28 VITALS — DIASTOLIC BLOOD PRESSURE: 72 MMHG | HEART RATE: 90 BPM | SYSTOLIC BLOOD PRESSURE: 129 MMHG

## 2022-10-28 DIAGNOSIS — E66.9: ICD-10-CM

## 2022-10-28 DIAGNOSIS — K21.9: ICD-10-CM

## 2022-10-28 DIAGNOSIS — Z88.0: ICD-10-CM

## 2022-10-28 DIAGNOSIS — R41.0: Primary | ICD-10-CM

## 2022-10-28 DIAGNOSIS — Z79.899: ICD-10-CM

## 2022-10-28 DIAGNOSIS — Z88.1: ICD-10-CM

## 2022-10-28 DIAGNOSIS — D64.9: ICD-10-CM

## 2022-10-28 DIAGNOSIS — E03.9: ICD-10-CM

## 2022-10-29 ENCOUNTER — HOSPITAL ENCOUNTER (EMERGENCY)
Dept: HOSPITAL 50 - VM.ED | Age: 42
Discharge: HOME | End: 2022-10-29
Payer: MEDICAID

## 2022-10-29 VITALS — DIASTOLIC BLOOD PRESSURE: 77 MMHG | HEART RATE: 82 BPM | SYSTOLIC BLOOD PRESSURE: 141 MMHG

## 2022-10-29 DIAGNOSIS — Z88.8: ICD-10-CM

## 2022-10-29 DIAGNOSIS — Z88.0: ICD-10-CM

## 2022-10-29 DIAGNOSIS — F19.10: ICD-10-CM

## 2022-10-29 DIAGNOSIS — N39.0: Primary | ICD-10-CM

## 2022-10-29 LAB
ANION GAP SERPL CALC-SCNC: 16.5 MMOL/L (ref 5–15)
BARBITURATES UR QL SCN: NEGATIVE
BENZODIAZ UR QL SCN: NEGATIVE
BUPRENORPHINE UR QL: NEGATIVE
CHLORIDE SERPL-SCNC: 104 MMOL/L (ref 98–107)
EGFRCR SERPLBLD CKD-EPI 2021: 111 ML/MIN (ref 60–?)
METHADONE UR QL SCN: POSITIVE
SODIUM SERPL-SCNC: 141 MMOL/L (ref 136–145)
THC UR QL SCN>50 NG/ML: NEGATIVE

## 2022-10-29 PROCEDURE — 84443 ASSAY THYROID STIM HORMONE: CPT

## 2022-10-29 PROCEDURE — 86140 C-REACTIVE PROTEIN: CPT

## 2022-10-29 PROCEDURE — 81001 URINALYSIS AUTO W/SCOPE: CPT

## 2022-10-29 PROCEDURE — 87086 URINE CULTURE/COLONY COUNT: CPT

## 2022-10-29 PROCEDURE — 80053 COMPREHEN METABOLIC PANEL: CPT

## 2022-10-29 PROCEDURE — 83735 ASSAY OF MAGNESIUM: CPT

## 2022-10-29 PROCEDURE — 80305 DRUG TEST PRSMV DIR OPT OBS: CPT

## 2022-10-29 PROCEDURE — 85025 COMPLETE CBC W/AUTO DIFF WBC: CPT

## 2022-10-29 PROCEDURE — 99284 EMERGENCY DEPT VISIT MOD MDM: CPT

## 2023-02-10 ENCOUNTER — HOSPITAL ENCOUNTER (EMERGENCY)
Dept: HOSPITAL 43 - DL.ED | Age: 43
Discharge: HOME | End: 2023-02-10
Payer: MEDICAID

## 2023-02-10 VITALS — DIASTOLIC BLOOD PRESSURE: 96 MMHG | SYSTOLIC BLOOD PRESSURE: 135 MMHG | HEART RATE: 83 BPM
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
## 2023-02-10 DIAGNOSIS — Z88.0: ICD-10-CM

## 2023-02-10 DIAGNOSIS — F17.210: ICD-10-CM

## 2023-02-10 DIAGNOSIS — M25.572: Primary | ICD-10-CM
